# Patient Record
Sex: FEMALE | Race: WHITE
[De-identification: names, ages, dates, MRNs, and addresses within clinical notes are randomized per-mention and may not be internally consistent; named-entity substitution may affect disease eponyms.]

---

## 2021-05-11 ENCOUNTER — DASHBOARD ENCOUNTERS (OUTPATIENT)
Age: 46
End: 2021-05-11

## 2021-05-11 ENCOUNTER — OFFICE VISIT (OUTPATIENT)
Dept: URBAN - METROPOLITAN AREA CLINIC 23 | Facility: CLINIC | Age: 46
End: 2021-05-11
Payer: COMMERCIAL

## 2021-05-11 ENCOUNTER — LAB OUTSIDE AN ENCOUNTER (OUTPATIENT)
Dept: URBAN - METROPOLITAN AREA CLINIC 23 | Facility: CLINIC | Age: 46
End: 2021-05-11

## 2021-05-11 ENCOUNTER — WEB ENCOUNTER (OUTPATIENT)
Dept: URBAN - METROPOLITAN AREA CLINIC 23 | Facility: CLINIC | Age: 46
End: 2021-05-11

## 2021-05-11 DIAGNOSIS — R19.7 DIARRHEA: ICD-10-CM

## 2021-05-11 PROCEDURE — 99204 OFFICE O/P NEW MOD 45 MIN: CPT | Performed by: INTERNAL MEDICINE

## 2021-05-11 RX ORDER — LEVOTHYROXINE SODIUM 40 UG/ML
1.25 ML INJECTION, SOLUTION INTRAVENOUS ONCE A DAY
Status: ACTIVE | COMMUNITY

## 2021-05-11 NOTE — HPI-TODAY'S VISIT:
45F son has UC--on stelara started with diarrheA ON Apr 28. she thought it was from diclofenac which she is taking for her foot still continued to have diarrhea. it was 5-6 loose BM a day. she was given cipro and flagyl which she has been on for past 3 days. diarrhea is may be a little less no blood in stool. no N/V. LLQ pain + severity 5/10 on and off. also had lower back pain no recent abx use before syx started never had colon no abd surgery

## 2021-05-13 ENCOUNTER — TELEPHONE ENCOUNTER (OUTPATIENT)
Dept: URBAN - METROPOLITAN AREA CLINIC 6 | Facility: CLINIC | Age: 46
End: 2021-05-13

## 2021-05-15 LAB — GASTROINTESTINAL PATHOGEN: (no result)
